# Patient Record
Sex: FEMALE | Race: WHITE
[De-identification: names, ages, dates, MRNs, and addresses within clinical notes are randomized per-mention and may not be internally consistent; named-entity substitution may affect disease eponyms.]

---

## 2018-05-22 ENCOUNTER — HOSPITAL ENCOUNTER (EMERGENCY)
Dept: HOSPITAL 97 - ER | Age: 43
Discharge: HOME | End: 2018-05-22
Payer: COMMERCIAL

## 2018-05-22 DIAGNOSIS — F32.9: ICD-10-CM

## 2018-05-22 DIAGNOSIS — R10.2: Primary | ICD-10-CM

## 2018-05-22 LAB
ALBUMIN SERPL BCP-MCNC: 4.1 G/DL (ref 3.2–5.5)
ALP SERPL-CCNC: 66 IU/L (ref 42–121)
ALT SERPL W P-5'-P-CCNC: 19 IU/L (ref 10–60)
AST SERPL W P-5'-P-CCNC: 21 IU/L (ref 10–42)
BUN BLD-MCNC: 10 MG/DL (ref 6–20)
GLUCOSE SERPLBLD-MCNC: 101 MG/DL (ref 65–120)
HCT VFR BLD CALC: 36.5 % (ref 36–45)
LIPASE SERPL-CCNC: 30 U/L (ref 22–51)
LYMPHOCYTES # SPEC AUTO: 2.5 K/UL (ref 0.7–4.9)
MCH RBC QN AUTO: 25.3 PG (ref 27–35)
MCV RBC: 78.2 FL (ref 80–100)
PMV BLD: 9.2 FL (ref 7.6–11.3)
POTASSIUM SERPL-SCNC: 3.6 MEQ/L (ref 3.6–5)
RBC # BLD: 4.67 M/UL (ref 3.86–4.86)
UA COMPLETE W REFLEX CULTURE PNL UR: (no result)

## 2018-05-22 PROCEDURE — 85025 COMPLETE CBC W/AUTO DIFF WBC: CPT

## 2018-05-22 PROCEDURE — 81003 URINALYSIS AUTO W/O SCOPE: CPT

## 2018-05-22 PROCEDURE — 87086 URINE CULTURE/COLONY COUNT: CPT

## 2018-05-22 PROCEDURE — 87186 SC STD MICRODIL/AGAR DIL: CPT

## 2018-05-22 PROCEDURE — 96361 HYDRATE IV INFUSION ADD-ON: CPT

## 2018-05-22 PROCEDURE — 87077 CULTURE AEROBIC IDENTIFY: CPT

## 2018-05-22 PROCEDURE — 80048 BASIC METABOLIC PNL TOTAL CA: CPT

## 2018-05-22 PROCEDURE — 99284 EMERGENCY DEPT VISIT MOD MDM: CPT

## 2018-05-22 PROCEDURE — 96374 THER/PROPH/DIAG INJ IV PUSH: CPT

## 2018-05-22 PROCEDURE — 83690 ASSAY OF LIPASE: CPT

## 2018-05-22 PROCEDURE — 80076 HEPATIC FUNCTION PANEL: CPT

## 2018-05-22 PROCEDURE — 36415 COLL VENOUS BLD VENIPUNCTURE: CPT

## 2018-05-22 PROCEDURE — 81025 URINE PREGNANCY TEST: CPT

## 2018-05-22 PROCEDURE — 81015 MICROSCOPIC EXAM OF URINE: CPT

## 2018-05-22 PROCEDURE — 96375 TX/PRO/DX INJ NEW DRUG ADDON: CPT

## 2018-05-22 PROCEDURE — 87088 URINE BACTERIA CULTURE: CPT

## 2018-05-22 PROCEDURE — 74177 CT ABD & PELVIS W/CONTRAST: CPT

## 2018-05-22 NOTE — EDPHYS
Physician Documentation                                                                           

 [facility]                                                                

Name: Leo Pepe                                                                           

Age: 43 yrs                                                                                       

Sex: Female                                                                                       

: 1975                                                                                   

MRN: [id number]                                                                                   

Arrival Date: 2018                                                                          

Time: 17:53                                                                                       

Account#: [id number]                                                                            

Bed 19                                                                                            

Private MD: Tobi Ashraf                                                                          

ED Physician Will Miranda                                                                       

HPI:                                                                                              

                                                                                             

21:00 This 43 yrs old  Female presents to ER via Ambulatory with complaints of       gs  

      Abdominal Pain.                                                                             

21:00 The patient presents with abdominal pain in the epigastric area. Onset: The             gs  

      symptoms/episode began/occurred gradually. The symptoms do not radiate. Associated          

      signs and symptoms: Pertinent negatives: nausea and vomiting, diarrhea. The symptoms        

      are described as crampy. Modifying factors: The symptoms are alleviated by nothing, the     

      symptoms are aggravated by nothing. Severity of pain: At its worst the pain was             

      moderate in the emergency department the pain has improved mildly. The patient has          

      experienced similar episodes in the past, a few times.                                      

                                                                                                  

OB/GYN:                                                                                           

17:55 LMP 2018                                                                           tw2 

                                                                                                  

Historical:                                                                                       

- Allergies:                                                                                      

17:57 No Known Allergies;                                                                     tw2 

- Home Meds:                                                                                      

17:57 Cymbalta 30 mg oral cpDR 1 cap once daily [Active]; Adderall XR 30 mg Oral cp24 1 cap   tw2 

      once daily [Active];                                                                        

- PMHx:                                                                                           

17:57 Depression;                                                                             tw2 

- PSHx:                                                                                           

17:57 None;                                                                                   tw2 

                                                                                                  

- Immunization history:: Adult Immunizations up to date.                                          

- Social history:: Smoking status: Patient/guardian denies using tobacco, the patient             

  reports quitting approximately 8 years ago.                                                     

- Ebola Screening: : Patient denies travel to an Ebola-affected area in the 21 days               

  before illness onset.                                                                           

                                                                                                  

                                                                                                  

ROS:                                                                                              

21:00 All other systems are negative.                                                         gs  

                                                                                                  

Exam:                                                                                             

21:00 Head/Face:  Normocephalic, atraumatic. Eyes:  Pupils equal round and reactive to light, gs  

      extra-ocular motions intact.  Lids and lashes normal.  Conjunctiva and sclera are           

      non-icteric and not injected.  Cornea within normal limits.  Periorbital areas with no      

      swelling, redness, or edema. ENT:  Nares patent. No nasal discharge, no septal              

      abnormalities noted.  Tympanic membranes are normal and external auditory canals are        

      clear.  Oropharynx with no redness, swelling, or masses, exudates, or evidence of           

      obstruction, uvula midline.  Mucous membranes moist. Neck:  Trachea midline, no             

      thyromegaly or masses palpated, and no cervical lymphadenopathy.  Supple, full range of     

      motion without nuchal rigidity, or vertebral point tenderness.  No Meningismus.             

      Chest/axilla:  Normal chest wall appearance and motion.  Nontender with no deformity.       

      No lesions are appreciated. Cardiovascular:  Regular rate and rhythm with a normal S1       

      and S2.  No gallops, murmurs, or rubs.  Normal PMI, no JVD.  No pulse deficits.             

      Respiratory:  Lungs have equal breath sounds bilaterally, clear to auscultation and         

      percussion.  No rales, rhonchi or wheezes noted.  No increased work of breathing, no        

      retractions or nasal flaring. Back:  No spinal tenderness.  No costovertebral               

      tenderness.  Full range of motion. Skin:  Warm, dry with normal turgor.  Normal color       

      with no rashes, no lesions, and no evidence of cellulitis. MS/ Extremity:  Pulses           

      equal, no cyanosis.  Neurovascular intact.  Full, normal range of motion. Neuro:  Awake     

      and alert, GCS 15, oriented to person, place, time, and situation.  Cranial nerves          

      II-XII grossly intact.  Motor strength 5/5 in all extremities.  Sensory grossly intact.     

       Cerebellar exam normal.  Normal gait.                                                      

21:00 Constitutional: The patient appears alert, awake.                                           

21:00 Abdomen/GI: Palpation: moderate abdominal tenderness, in the epigastric area, right         

      upper quadrant and left upper quadrant, rebound tenderness, is not appreciated.             

                                                                                                  

Vital Signs:                                                                                      

17:55  / 95; Pulse 105; Resp 18; Temp 98.(TE); Pulse Ox 99% on R/A; Weight 48.53 kg     tw2 

      (R); Height 5 ft. 4 in. (162.56 cm) (R); Pain 2/10;                                         

18:51  / 98; Pulse 81; Resp 16; Pulse Ox 100% on R/A;                                   sg  

19:15  / 98; Pulse 78; Resp 16; Pulse Ox 100% on R/A; Pain 6/10;                        lp1 

20:00  / 95; Pulse 70; Resp 16; Pulse Ox 100% on R/A;                                   lp1 

17:55 Body Mass Index 18.37 (48.53 kg, 162.56 cm)                                             tw2 

17:55 if i eat it gets really bad 10/10                                                       tw2 

                                                                                                  

MDM:                                                                                              

18:15 Patient medically screened.                                                             gs  

21:00 Differential diagnosis: gastroesophageal reflux disease, non-specific abd pain,         gs  

      pancreatitis. Data reviewed: vital signs, nurses notes. Response to treatment: the          

      patient's symptoms have markedly improved after treatment, and as a result, I will          

      discharge patient.                                                                          

                                                                                                  

                                                                                             

18:25 Order name: Basic Metabolic Panel; Complete Time: 19:22                                   

                                                                                             

18:25 Order name: CBC with Diff; Complete Time: 19:22                                           

                                                                                             

18:25 Order name: Creatinine for Radiology; Complete Time: 19:22                                

                                                                                             

18:25 Order name: Hepatic Function; Complete Time: 19:22                                        

                                                                                             

18:25 Order name: Lipase; Complete Time: 19:22                                                  

                                                                                             

18:25 Order name: Urine Microscopic Only; Complete Time: 20:57                                  

                                                                                             

18:25 Order name: IV Saline Lock; Complete Time: 18:30                                          

                                                                                             

18:25 Order name: CT Abd/Pelvis - W/Contrast; Complete Time: 20:57                              

                                                                                             

19:23 Order name: Urine Dipstick--Ancillary (enter results); Complete Time: 20:57             [facility] 

                                                                                             

19:23 Order name: Urine Pregnancy--Ancillary (enter results); Complete Time: 20:57            [facility] 

                                                                                             

19:38 Order name: Urine Culture                                                               [facility]

                                                                                             

18:25 Order name: Labs collected and sent; Complete Time: 18:30                                 

                                                                                             

18:25 Order name: Urine Dipstick-Ancillary (obtain specimen); Complete Time: 19:33              

                                                                                             

18:25 Order name: Urine Pregnancy Test (obtain specimen); Complete Time: 19:22                  

                                                                                                  

Administered Medications:                                                                         

18:49 Drug: NS 0.9% 1000 ml Route: IV; Rate: 1 bolus; Site: right antecubital;                sg  

20:15 Follow up: IV Status: Completed infusion; IV Intake: 1000ml                             lp1 

19:32 Drug: Pepcid 20 mg Route: IVP; Site: right antecubital;                                 lp1 

20:41 Follow up: Response: No adverse reaction                                                lp1 

19:32 Drug: fentaNYL (PF) 25 mcg Route: IVP; Site: right antecubital;                         lp1 

20:42 Follow up: Response: Pain is decreased                                                  lp1 

                                                                                                  

                                                                                                  

Disposition:                                                                                      

18 21:04 Discharged to Home. Impression: Abdominal and pelvic pain.                         

- Condition is Stable.                                                                            

- Discharge Instructions: Abdominal Pain, Adult, Easy-to-Read.                                    

- Prescriptions for Pepcid 20 mg Oral Tablet - take 1 tablet by ORAL route 2 times per            

  day; 60 tablet. Miralax 17 gram/dose Oral - take 1 packet by ORAL route once daily              

  dilute powder in 8 ounces of water or juice; 1 bottle.                                          

- Medication Reconciliation Form, Thank You Letter, Antibiotic Education, Prescription            

  Opioid Use form.                                                                                

- Follow up: Tariq Arias MD; When: 2 - 3 days; Reason: Re-evaluation by your                 

  physician.                                                                                      

                                                                                                  

                                                                                                  

                                                                                                  

Signatures:                                                                                       

Dispatcher [employer]                           EDMS                                                 

Maurice Fitzpatrick RN                         RN   sg                                                   

Torri Vaughn RN                         RN   lp1                                                  

Priyanka Catherine RN                          RN   tw2                                                  

Will Miranda MD MD   gs                                                   

                                                                                                  

Corrections: (The following items were deleted from the chart)                                    

21:26 21:04 2018 21:04 Discharged to Home. Impression: Abdominal and pelvic pain.       lp1 

      Condition is Stable. Forms are Medication Reconciliation Form, Thank You Letter,            

      Antibiotic Education, Prescription Opioid Use. Follow up: Tariq Arias; When: 2 - 3       

      days; Reason: Re-evaluation by your physician. gs                                           

                                                                                                  

**************************************************************************************************

## 2018-05-22 NOTE — ER
Nurse's Notes                                                                                     

 [facility]                                                                

Name: Leo Pepe                                                                           

Age: 43 yrs                                                                                       

Sex: Female                                                                                       

: 1975                                                                                   

MRN: [id number]                                                                                   

Arrival Date: 2018                                                                          

Time: 17:53                                                                                       

Account#: [id number]                                                                            

Bed 19                                                                                            

Private MD: Tobi Ashraf                                                                          

Diagnosis: Abdominal and pelvic pain                                                              

                                                                                                  

Presentation:                                                                                     

                                                                                             

17:55 Presenting complaint: Patient states: severe abdominal pain  and radiates upward. tw2 

      Transition of care: patient was not received from another setting of care. Onset of         

      symptoms was May 22, 2018. Risk Assessment: Do you want to hurt yourself or someone         

      else? Patient reports no desire to harm self or others. Initial Sepsis Screen: Does the     

      patient meet any 2 criteria? No. Patient's initial sepsis screen is negative. Does the      

      patient have a suspected source of infection? No. Patient's initial sepsis screen is        

      negative. Care prior to arrival: None.                                                      

17:55 Method Of Arrival: Ambulatory                                                           tw2 

17:55 Acuity: MICHELLE 3                                                                           tw2 

                                                                                                  

OB/GYN:                                                                                           

17:55 LMP 2018                                                                           tw2 

                                                                                                  

Historical:                                                                                       

- Allergies:                                                                                      

17:57 No Known Allergies;                                                                     tw2 

- Home Meds:                                                                                      

17:57 Cymbalta 30 mg oral cpDR 1 cap once daily [Active]; Adderall XR 30 mg Oral cp24 1 cap   tw2 

      once daily [Active];                                                                        

- PMHx:                                                                                           

17:57 Depression;                                                                             tw2 

- PSHx:                                                                                           

17:57 None;                                                                                   tw2 

                                                                                                  

- Immunization history:: Adult Immunizations up to date.                                          

- Social history:: Smoking status: Patient/guardian denies using tobacco, the patient             

  reports quitting approximately 8 years ago.                                                     

- Ebola Screening: : Patient denies travel to an Ebola-affected area in the 21 days               

  before illness onset.                                                                           

                                                                                                  

                                                                                                  

Screenin:40 Abuse screen: Denies threats or abuse. Denies injuries from another. Nutritional        sg  

      screening: No deficits noted. Tuberculosis screening: No symptoms or risk factors           

      identified. Never had TB. Fall Risk None identified.                                        

                                                                                                  

Assessment:                                                                                       

18:40 General: Appears in no apparent distress. comfortable, well groomed, well developed,    sg  

      well nourished, Behavior is calm, cooperative, appropriate for age. Pain: Complains of      

      pain in abdomen Quality of pain is described as aching, tender. Neuro: No deficits          

      noted. Cardiovascular: Heart tones S1 S2 present Capillary refill is brisk in bilateral     

      fingers Patient's skin is warm and dry. Chest pain is denied. Respiratory: Airway is        

      patent Respiratory effort is even, unlabored, Respiratory pattern is regular,               

      symmetrical. GI: Bowel sounds present X 4 quads. Abd is soft X 4 quads Abdomen is           

      tender to palpation in right lower quadrant and left lower quadrant. GI: Reports lower      

      abdominal pain, nausea, Patient currently denies vomiting. : No signs and/or symptoms     

      were reported regarding the genitourinary system. EENT: No signs and/or symptoms were       

      reported regarding the EENT system. Derm: Skin is intact, is healthy with good turgor,      

      Skin is dry, Skin is pink, warm \T\ dry. Musculoskeletal: No signs and/or symptoms          

      reported regarding the musculoskeletal system.                                              

18:50 Reassessment: CT called, spoke with Philomena, notified pt finished drinking PO          sg  

      contrast. pt requesting the BP cuff be placed below the upper arm d/t an implant.           

19:20 Reassessment: Patient is alert, oriented x 3, equal unlabored respirations, skin        lp1 

      warm/dry/pink. States pain to mid abdomen at this time, 6/10 on pain scale, Provider        

      notified; patient aware of waiting to be taken to CT.                                       

19:20 GI: Abdomen is flat, Patient currently denies nausea.                                   lp1 

20:39 Reassessment: Patient returned from CT at this time; States comfortable, aware of       lp1 

      waiting for CT results; Family at bedside.                                                  

21:25 Reassessment: Patient appears in no apparent distress at this time. Patient and/or      lp1 

      family updated on plan of care and expected duration. Pain level reassessed. Patient is     

      alert, oriented x 3, equal unlabored respirations, skin warm/dry/pink.                      

                                                                                                  

Vital Signs:                                                                                      

17:55  / 95; Pulse 105; Resp 18; Temp 98.(TE); Pulse Ox 99% on R/A; Weight 48.53 kg     tw2 

      (R); Height 5 ft. 4 in. (162.56 cm) (R); Pain 2/10;                                         

18:51  / 98; Pulse 81; Resp 16; Pulse Ox 100% on R/A;                                   sg  

19:15  / 98; Pulse 78; Resp 16; Pulse Ox 100% on R/A; Pain 6/10;                        lp1 

20:00  / 95; Pulse 70; Resp 16; Pulse Ox 100% on R/A;                                   lp1 

17:55 Body Mass Index 18.37 (48.53 kg, 162.56 cm)                                             tw2 

17:55 if i eat it gets really bad 10/10                                                       tw2 

                                                                                                  

ED Course:                                                                                        

17:53 Patient arrived in ED.                                                                  mr  

17:54 Tobi Ashraf MD is Private Physician.                                                  mr  

17:55 Triage completed.                                                                       tw2 

17:55 Arm band placed on.                                                                     tw2 

17:59 Will Miranda MD is Attending Physician.                                              gs  

18:40 Initial lab(s) drawn, by me, sent to lab. Missed attempt(s): 22 gauge in right upper    sg  

      arm. Bleeding controlled, band aid applied, catheter tip intact.                            

18:43 Maurice Fitzpatrcik, RN is Primary Nurse.                                                       sg  

18:49 Inserted saline lock: 20 gauge in right antecubital area, using aseptic technique.      sg  

      Blood collected.                                                                            

18:52 Oral contrast reported to be complete.                                                  mw3 

19:15 Urine collected: clean catch specimen, clear.                                           lp1 

19:32 Torri Vaughn, RN is Primary Nurse.                                                       lp1 

19:34 Patient has correct armband on for positive identification. Bed in low position. Call   lp1 

      light in reach. Pulse ox on. NIBP on.                                                       

19:36 No provider procedures requiring assistance completed.                                  lp1 

20:29 Patient moved to CT via wheelchair.                                                     vm2 

20:32 CT completed. Patient tolerated procedure well. Patient moved back from CT.             vm2 

20:34 CT Abd/Pelvis - W/Contrast In Process Unspecified.                                      EDMS

21:04 Tariq Arias MD is Referral Physician.                                              gs  

21:25 IV discontinued, No redness/swelling at site. Pressure dressing applied.                lp1 

                                                                                                  

Administered Medications:                                                                         

18:49 Drug: NS 0.9% 1000 ml Route: IV; Rate: 1 bolus; Site: right antecubital;                sg  

20:15 Follow up: IV Status: Completed infusion; IV Intake: 1000ml                             lp1 

19:32 Drug: Pepcid 20 mg Route: IVP; Site: right antecubital;                                 lp1 

20:41 Follow up: Response: No adverse reaction                                                lp1 

19:32 Drug: fentaNYL (PF) 25 mcg Route: IVP; Site: right antecubital;                         lp1 

20:42 Follow up: Response: Pain is decreased                                                  lp1 

                                                                                                  

                                                                                                  

Intake:                                                                                           

20:15 IV: 1000ml; Total: 1000ml.                                                              lp1 

                                                                                                  

Outcome:                                                                                          

21:04 Discharge ordered by MD.                                                                gs  

21:25 Discharged to home ambulatory, with family.                                             lp1 

21:25 Condition: good                                                                             

21:25 Discharge instructions given to patient, Instructed on discharge instructions, follow       

      up and referral plans. medication usage, Demonstrated understanding of instructions,        

      follow-up care, medications, Prescriptions given X 2.                                       

21:26 Patient left the ED.                                                                    lp1 

                                                                                                  

Signatures:                                                                                       

Dispatcher [employer]                           EDMS                                                 

Maurice Fitzpatrick RN                         RN                                                      

Angelica Alejo                                mr                                                   

Torri Vaughn RN                         RN   lp1                                                  

Priyanka Catherine RN                          RN   tw2                                                  

Philomena Peck                            2                                                  

Will Miranda MD MD gs Willis, Michelle                             mw3                                                  

                                                                                                  

**************************************************************************************************

## 2018-05-22 NOTE — RAD REPORT
EXAM DESCRIPTION:  CT - Abdomen   Pelvis W Contrast - 5/22/2018 8:34 pm

 

CLINICAL HISTORY:   Abdominal pain.

 

COMPARISON:  None.

 

TECHNIQUE:  Computed axial tomography of the abdomen and pelvis was obtained. 100 cc Isovue-300 is ad
ministered intravenously. Oral contrast was given.

 

All CT scans are performed using dose optimization technique as appropriate and may include automated
 exposure control or mA/KV adjustment according to patient size.

 

FINDINGS:

The liver, spleen, pancreas, adrenals and kidneys appear unremarkable. A splenic granuloma is noted.

 

The appendix is normal caliber. There is no evidence of diverticulitis

 

An adnexal mass is not seen. A large amount of stool is present throughout the colon

 

IMPRESSION:  A large amount stool is present throughout the colon. Otherwise no acute abnormality is 
seen

## 2018-05-23 ENCOUNTER — HOSPITAL ENCOUNTER (EMERGENCY)
Dept: HOSPITAL 97 - ER | Age: 43
Discharge: HOME | End: 2018-05-23
Payer: COMMERCIAL

## 2018-05-23 DIAGNOSIS — K59.00: Primary | ICD-10-CM

## 2018-05-23 DIAGNOSIS — F32.9: ICD-10-CM

## 2018-05-23 PROCEDURE — 99281 EMR DPT VST MAYX REQ PHY/QHP: CPT

## 2018-05-23 NOTE — EDPHYS
Physician Documentation                                                                           

 [facility]                                                                

Name: Leo Pepe                                                                           

Age: 43 yrs                                                                                       

Sex: Female                                                                                       

: 1975                                                                                   

MRN: [id number]                                                                                   

Arrival Date: 2018                                                                          

Time: 11:41                                                                                       

Account#: [id number]                                                                            

Bed 27                                                                                            

Private MD: Tobi Ashraf                                                                          

ED Physician John Monroe                                                                         

HPI:                                                                                              

                                                                                             

13:22 This 43 yrs old  Female presents to ER via Ambulatory with complaints of       rn  

      Abdominal Pain.                                                                             

13:22 The patient presents with abdominal pain. Onset: The symptoms/episode began/occurred 3  rn  

      day(s) ago. The symptoms do not radiate. Associated signs and symptoms: Pertinent           

      positives: constipation, nausea, Pertinent negatives: fever, vomiting. Severity of          

      pain: At its worst the pain was moderate in the emergency department the pain is            

      unchanged. Reports seen here last night, diagnosed with constipation, no BM for a few       

      days, no abd surgeries, no fever, took 1 dose of miralax without resolution so came         

      back because of abd cramping..                                                              

                                                                                                  

OB/GYN:                                                                                           

11:54 LMP 2018                                                                           aj  

                                                                                                  

Historical:                                                                                       

- Allergies:                                                                                      

11:54 No Known Allergies;                                                                     aj  

- Home Meds:                                                                                      

11:54 Adderall XR 30 mg Oral cp24 1 cap once daily [Active]; Cymbalta 30 mg Oral cpDR 1 cap   aj  

      once daily [Active];                                                                        

- PMHx:                                                                                           

11:54 Depression;                                                                             aj  

- PSHx:                                                                                           

11:54 None;                                                                                   aj  

                                                                                                  

- Immunization history:: Adult Immunizations up to date.                                          

- Social history:: Smoking status: Patient/guardian denies using tobacco.                         

- Family history:: not pertinent.                                                                 

- Ebola Screening: : Patient negative for fever greater than or equal to 101.5 degrees            

  Fahrenheit, and additional compatible Ebola Virus Disease symptoms.                             

- Hospitalizations: : No recent hospitalization is reported.                                      

                                                                                                  

                                                                                                  

ROS:                                                                                              

13:22 Constitutional: Negative for fever, chills, and weight loss, Eyes: Negative for injury, rn  

      pain, redness, and discharge, Neck: Negative for injury, pain, and swelling,                

      Cardiovascular: Negative for chest pain, palpitations, and edema, Respiratory: Negative     

      for shortness of breath, cough, wheezing, and pleuritic chest pain, Abdomen/GI:             

      Negative for vomiting, diarrhea Back: Negative for injury and pain, MS/Extremity:           

      Negative for injury and deformity, Skin: Negative for injury, rash, and discoloration,      

      Neuro: Negative for headache, weakness, numbness, tingling, and seizure.                    

                                                                                                  

Exam:                                                                                             

13:22 Constitutional:  This is a well developed, well nourished patient who is awake, alert,  rn  

      laying down and appears uncomfortable but not ill Head/Face:  Normocephalic,                

      atraumatic. ENT:  MMM Abdomen/GI:  soft, non-focal tenderness, no rebound/peritoneal        

      signs                                                                                       

                                                                                                  

Vital Signs:                                                                                      

11:54  / 97; Pulse 68; Resp 19; Temp 98.1; Pulse Ox 99% on R/A; Weight 48.08 kg; Height aj  

      5 ft. 4 in. (162.56 cm); Pain 10/10;                                                        

11:54 Body Mass Index 18.19 (48.08 kg, 162.56 cm)                                             aj  

                                                                                                  

MDM:                                                                                              

12:49 Patient medically screened.                                                             rn  

13:22 Differential diagnosis: non-specific abd pain, constipation. Data reviewed: vital       rn  

      signs, nurses notes, old medical records, and as a result, I will discharge patient.        

      Counseling: I had a detailed discussion with the patient and/or guardian regarding: the     

      historical points, exam findings, and any diagnostic results supporting the                 

      discharge/admit diagnosis, the need for outpatient follow up, to return to the              

      emergency department if symptoms worsen or persist or if there are any questions or         

      concerns that arise at home. ED course: Offered patient KUB, fluids, mag citrate,           

      enema, patient states prefers to due so at home, would be more comfortable, was not         

      properly counseled to management last night. CT no acute findings, just constipation,       

      no blockage or fecal retention..                                                            

                                                                                                  

Administered Medications:                                                                         

No medications were administered                                                                  

                                                                                                  

                                                                                                  

Disposition:                                                                                      

18 13:27 Discharged to Home. Impression: Constipation, unspecified, Unspecified             

  abdominal pain.                                                                                 

- Condition is Stable.                                                                            

- Discharge Instructions: Abdominal Pain, Adult, Constipation, Adult.                             

                                                                                                  

- Medication Reconciliation Form, Thank You Letter, Antibiotic Education, Prescription            

  Opioid Use form.                                                                                

- Follow up: Private Physician; When: As needed; Reason: Recheck today's complaints,              

  Re-evaluation by your physician.                                                                

- Problem is an ongoing problem.                                                                  

- Symptoms are unchanged.                                                                         

                                                                                                  

                                                                                                  

                                                                                                  

Signatures:                                                                                       

Jessica Cooper RN RN aj Nieto, Roman, MD MD rn Seth, Rhonda                                 rs2                                                  

                                                                                                  

Corrections: (The following items were deleted from the chart)                                    

13:47 13:27 2018 13:27 Discharged to Home. Impression: Constipation, unspecified;       rs2 

      Unspecified abdominal pain. Condition is Stable. Forms are Medication Reconciliation        

      Form, Thank You Letter, Antibiotic Education, Prescription Opioid Use. Follow up:           

      Private Physician; When: As needed; Reason: Recheck today's complaints, Re-evaluation       

      by your physician. Problem is an ongoing problem. Symptoms are unchanged. rn                

                                                                                                  

**************************************************************************************************

## 2018-05-23 NOTE — ER
Nurse's Notes                                                                                     

 [facility]                                                                

Name: Leo Pepe                                                                           

Age: 43 yrs                                                                                       

Sex: Female                                                                                       

: 1975                                                                                   

MRN: [id number]                                                                                   

Arrival Date: 2018                                                                          

Time: 11:41                                                                                       

Account#: [id number]                                                                            

Bed 27                                                                                            

Private MD: Tobi Ashraf                                                                          

Diagnosis: Constipation, unspecified;Unspecified abdominal pain                                   

                                                                                                  

Presentation:                                                                                     

                                                                                             

11:53 Presenting complaint: Patient states: Reports upper abdominal pain since yesterday.     aj  

      Seen in this ER last night with same complaint. Discharged to home and instructed to        

      follow up with GI. Transition of care: patient was not received from another setting of     

      care. Onset of symptoms was May 22, 2018. Care prior to arrival: None.                      

11:53 Method Of Arrival: Ambulatory                                                           aj  

11:53 Acuity: MICHELLE 3                                                                           aj  

13:46 Risk Assessment: Do you want to hurt yourself or someone else? Patient reports no       rs2 

      desire to harm self or others. Initial Sepsis Screen: Does the patient meet any 2           

      criteria? No. Patient's initial sepsis screen is negative. Does the patient have a          

      suspected source of infection? No. Patient's initial sepsis screen is negative.             

                                                                                                  

Triage Assessment:                                                                                

11:54 General: Appears in no apparent distress. uncomfortable, Behavior is calm, cooperative, aj  

      appropriate for age. Pain: Complains of pain in abdomen. Neuro: Level of Consciousness      

      is awake, alert, obeys commands, Oriented to person, place, time, situation,                

      Appropriate for age. Respiratory: Airway is patent Respiratory effort is even,              

      unlabored, Respiratory pattern is regular, symmetrical. GI: Abdomen is flat,                

      non-distended, Reports nausea, vomiting. Derm: Skin is intact, is healthy with good         

      turgor, Skin is pink, warm \T\ dry. normal.                                                 

                                                                                                  

OB/GYN:                                                                                           

11:54 LMP 2018                                                                           aj  

                                                                                                  

Historical:                                                                                       

- Allergies:                                                                                      

11:54 No Known Allergies;                                                                     aj  

- Home Meds:                                                                                      

11:54 Adderall XR 30 mg Oral cp24 1 cap once daily [Active]; Cymbalta 30 mg Oral cpDR 1 cap   aj  

      once daily [Active];                                                                        

- PMHx:                                                                                           

11:54 Depression;                                                                             aj  

- PSHx:                                                                                           

11:54 None;                                                                                   aj  

                                                                                                  

- Immunization history:: Adult Immunizations up to date.                                          

- Social history:: Smoking status: Patient/guardian denies using tobacco.                         

- Family history:: not pertinent.                                                                 

- Ebola Screening: : Patient negative for fever greater than or equal to 101.5 degrees            

  Fahrenheit, and additional compatible Ebola Virus Disease symptoms.                             

- Hospitalizations: : No recent hospitalization is reported.                                      

                                                                                                  

                                                                                                  

Screenin:42 Abuse screen: Denies threats or abuse. Nutritional screening: No deficits noted.        rs2 

      Tuberculosis screening: No symptoms or risk factors identified. Fall Risk None              

      identified.                                                                                 

                                                                                                  

Assessment:                                                                                       

13:42 General: Appears uncomfortable, unkempt, Behavior is calm, cooperative, appropriate for rs2 

      age. Pain: Complains of pain in abdomen Pain currently is 7 out of 10 on a pain scale.      

      Quality of pain is described as pressure. Cardiovascular: No deficits noted.                

      Respiratory: No deficits noted. GI: Abdomen is round Bowel sounds present X 4 quads.        

      Abd is soft Abdomen is tender to palpation X 4 quads. Reports constipation. : No          

      deficits noted. Musculoskeletal: No deficits noted.                                         

                                                                                                  

Vital Signs:                                                                                      

11:54  / 97; Pulse 68; Resp 19; Temp 98.1; Pulse Ox 99% on R/A; Weight 48.08 kg; Height aj  

      5 ft. 4 in. (162.56 cm); Pain 10/10;                                                        

11:54 Body Mass Index 18.19 (48.08 kg, 162.56 cm)                                             aj  

                                                                                                  

ED Course:                                                                                        

11:41 Patient arrived in ED.                                                                  sb2 

11:42 Tobi Ashraf MD is Private Physician.                                                  sb2 

11:54 Triage completed.                                                                       aj  

11:54 Arm band placed on right wrist. Patient placed in an exam room.                         aj  

12:49 John Monroe MD is Attending Physician.                                                alek  

12:49 Clarence Yanez PA is PHCP.                                                                gabriella  

13:42 Pia Luis is Primary Nurse.                                                          rs2 

13:42 Patient has correct armband on for positive identification. Warm blanket given.         rs2 

13:42 No provider procedures requiring assistance completed. Patient did not have IV access   rs2 

      during this emergency room visit.                                                           

                                                                                                  

Administered Medications:                                                                         

No medications were administered                                                                  

                                                                                                  

                                                                                                  

Outcome:                                                                                          

13:27 Discharge ordered by MD.                                                                rn  

13:42 Discharged to home ambulatory, with family.                                             rs2 

13:42 Condition: improved                                                                         

13:42 Discharge instructions given to patient.                                                    

13:47 Patient left the ED.                                                                    rs2 

                                                                                                  

Signatures:                                                                                       

Jessica Cooper RN RN aj Nieto, Roman, MD MD rn Page, Corey, PA PA cp Seth, Rhonda                                 rs2                                                  

Billeau, Beena                               sb2                                                  

                                                                                                  

**************************************************************************************************